# Patient Record
Sex: MALE | Race: BLACK OR AFRICAN AMERICAN | HISPANIC OR LATINO
[De-identification: names, ages, dates, MRNs, and addresses within clinical notes are randomized per-mention and may not be internally consistent; named-entity substitution may affect disease eponyms.]

---

## 2024-02-21 ENCOUNTER — NON-APPOINTMENT (OUTPATIENT)
Age: 24
End: 2024-02-21

## 2024-02-21 ENCOUNTER — APPOINTMENT (OUTPATIENT)
Dept: ORTHOPEDIC SURGERY | Facility: CLINIC | Age: 24
End: 2024-02-21
Payer: MEDICAID

## 2024-02-21 VITALS
HEART RATE: 56 BPM | SYSTOLIC BLOOD PRESSURE: 144 MMHG | BODY MASS INDEX: 36.57 KG/M2 | DIASTOLIC BLOOD PRESSURE: 75 MMHG | HEIGHT: 72 IN | WEIGHT: 270 LBS

## 2024-02-21 DIAGNOSIS — M79.644 PAIN IN RIGHT FINGER(S): ICD-10-CM

## 2024-02-21 PROBLEM — Z00.00 ENCOUNTER FOR PREVENTIVE HEALTH EXAMINATION: Status: ACTIVE | Noted: 2024-02-21

## 2024-02-21 PROCEDURE — 73140 X-RAY EXAM OF FINGER(S): CPT | Mod: RT

## 2024-02-21 PROCEDURE — 99203 OFFICE O/P NEW LOW 30 MIN: CPT | Mod: 25

## 2024-02-21 NOTE — END OF VISIT
[FreeTextEntry3] : This note was written by Gino Foster on 02/21/2024 acting solely as a scribe for Dr. Alex Bonner.   All medical record entries made by the Scribe were at my, Dr. Alex Bonner, direction and personally dictated by me on 02/21/2024. I have personally reviewed the chart and agree that the record accurately reflects my personal performance of the history, physical exam, assessment and plan.

## 2024-02-21 NOTE — DISCUSSION/SUMMARY
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   I had a discussion with the patient regarding today's visit, the prognosis of this diagnosis, and treatment recommendations and options.  At this time, I discussed treatment options of observation or surgical management. He told me that he would like to proceed with surgical management, I told him that I would not be able to operate on him until April. He told me that he would like to wait until then. I recommended an MRI to evaluate right little finger.  I would like to better evaluate the integrity of the volar plate to see if this is repairable or if he needs some type of reconstructive procedure.  He will follow up after his MRI to review the results and discuss treatment recommendations.   The patient has agreed to the above plan of management and has expressed full understanding. All questions were fully answered to the patient's satisfaction.   My cumulative time spent on this visit included: Preparation for the visit, review of the medical records, review of pertinent diagnostic studies, examination and counseling of the patient on the above diagnosis, treatment plan and prognosis, orders of diagnostic tests, medication and/or appropriate procedures and documentation in the medical records of today's visit.

## 2024-02-21 NOTE — HISTORY OF PRESENT ILLNESS
[Right] : right hand dominant [FreeTextEntry1] :  He comes in today for evaluation of right little finger injury 7-8 months ago while playing basketball. He states that it was dislocated at that time and was pushed back into place. He went to [facility], had x-rays done and was splinted. He has numbness and tingling, along with locking and extension.

## 2024-02-21 NOTE — PHYSICAL EXAM
[de-identified] : - Constitutional: This is a male in no obvious distress.  - Psych: Patient is alert and oriented to person, place and time.  Patient has a normal mood and affect. - Cardiovascular: Normal pulses throughout the upper extremities.  No significant varicosities are noted in the upper extremities. - Neuro: Strength and sensation are intact throughout the upper extremities.  Patient has normal coordination. - Respiratory:  Patient exhibits no evidence of shortness of breath or difficulty breathing. - Skin: No rashes, lesions, or other abnormalities are noted in the upper extremities. ---  Examination of his right little finger demonstrate hyperextension at the PIP joint with compensatory flexion at the DIP joint consistent with a swan-neck deformity.  There is gross instability of the PIP joint volar plate.  There is clicking with flexion which appears to be secondary to clicking of the lateral bands with flexion.  There is no evidence of trigger finger.  He is neurovascularly intact distally. [de-identified] : AP, lateral, and oblique radiographs of the right little finger demonstrate hyperextension at the PIP joint.

## 2024-02-28 ENCOUNTER — APPOINTMENT (OUTPATIENT)
Dept: MRI IMAGING | Facility: CLINIC | Age: 24
End: 2024-02-28
Payer: MEDICAID

## 2024-02-28 ENCOUNTER — OUTPATIENT (OUTPATIENT)
Dept: OUTPATIENT SERVICES | Facility: HOSPITAL | Age: 24
LOS: 1 days | End: 2024-02-28
Payer: MEDICAID

## 2024-02-28 DIAGNOSIS — M20.031 SWAN-NECK DEFORMITY OF RIGHT FINGER(S): ICD-10-CM

## 2024-02-28 PROCEDURE — 73218 MRI UPPER EXTREMITY W/O DYE: CPT

## 2024-02-28 PROCEDURE — 73218 MRI UPPER EXTREMITY W/O DYE: CPT | Mod: 26,RT

## 2024-03-06 ENCOUNTER — NON-APPOINTMENT (OUTPATIENT)
Age: 24
End: 2024-03-06

## 2024-03-06 PROBLEM — M20.031 SWAN-NECK DEFORMITY OF FINGER OF RIGHT HAND: Status: ACTIVE | Noted: 2024-02-21

## 2024-03-12 ENCOUNTER — APPOINTMENT (OUTPATIENT)
Dept: ORTHOPEDIC SURGERY | Facility: CLINIC | Age: 24
End: 2024-03-12
Payer: MEDICAID

## 2024-03-12 DIAGNOSIS — M20.031 SWAN-NECK DEFORMITY OF RIGHT FINGER(S): ICD-10-CM

## 2024-03-12 PROCEDURE — 99214 OFFICE O/P EST MOD 30 MIN: CPT

## 2024-03-12 NOTE — DISCUSSION/SUMMARY
[FreeTextEntry1] : I reviewed the MRI results with him.  I had a discussion regarding today's visit, the diagnosis and treatment recommendations and options.  We also discussed changes since the last visit.  At this time, I recommended surgery.  I recommended repair, possible reconstruction of his PIP joint volar plate.  I did tell him that if the volar plate is not amenable to repair/reconstruction, then he may require an FDS PIP joint tenodesis. At this time, he is in transition from his old insurance company and is unsure about whether he will be covered by insurance for the surgery and subsequent office visits and therapy. He will reach out once he resolves his issue with his insurance to discuss his options.  -  The nature and purposes of the operation/procedure was discussed in detail.  I discussed the surgical procedure in detail, as well as expected postoperative recovery and outcome. -  Possible risks, benefits, and complications (from known and unknown causes) of the procedure were discussed in detail.  -  Possible non-operative alternatives to the proposed treatment were discussed in detail.  -  He was told that possible risks/complications include, but are not limited to:  Infection, digital nerve or vessel injury, stiffness, painful scar, poor outcome, need for additional surgical procedures, and other unforeseen complications.  He understands that it is common to have a PIP joint flexion contracture after this procedure.  He understands that this may be permanent. -  In addition, the possibility of an "unsuccessful outcome," despite "successful surgery," was discussed with the patient.  Specifically, I discussed the chance of stretching out of the volar plate repair/reconstruction and possible recurrent symptoms. -  The patient fully understands these risks and wishes to proceed.  -  I had a lengthy discussion with the patient regarding today's visit, the diagnosis, and my surgical treatment recommendations.  The patient has agreed to the above plan of management and has expressed full understanding.  All questions were fully answered to the patient's satisfaction.  My cumulative time spent on today's visit was greater than 30 minutes and included: Preparation for the visit, review of the medical records, review of pertinent diagnostic studies, examination and counseling of the patient on the above diagnosis, treatment plan and prognosis, orders of diagnostic tests, medications and/or appropriate procedures and documentation in the medical records of today's visit.

## 2024-03-12 NOTE — HISTORY OF PRESENT ILLNESS
[FreeTextEntry1] : Follow-up regarding right little finger volar plate injury with instability of the volar plate, a swan-neck deformity and clicking of the lateral bands with flexion of the digit, status post an injury approximately 8 months ago.  See note from when he was seen in the office 20 days ago.  I recommended an MRI.  He comes in to review the results discuss treatment recommendations.

## 2024-03-12 NOTE — PHYSICAL EXAM
[de-identified] : - Constitutional: This is a male in no obvious distress.  - Psych: Patient is alert and oriented to person, place and time.  Patient has a normal mood and affect. - Cardiovascular: Normal pulses throughout the upper extremities.  No significant varicosities are noted in the upper extremities. - Neuro: Strength and sensation are intact throughout the upper extremities.  Patient has normal coordination. - Respiratory:  Patient exhibits no evidence of shortness of breath or difficulty breathing. - Skin: No rashes, lesions, or other abnormalities are noted in the upper extremities. ---  Examination of his right little finger demonstrate hyperextension at the PIP joint with compensatory flexion at the DIP joint consistent with a swan-neck deformity.  There is gross instability of the PIP joint volar plate.  There is clicking with flexion which appears to be secondary to clicking of the lateral bands with flexion.  There is no evidence of trigger finger.  He is neurovascularly intact distally. [de-identified] : An MRI of his right hand dated 2/28/2024 demonstrated: 1.  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 2.  Osseous remodeling and productive change about the radial margin of the little finger proximal phalanx head, likely posttraumatic in etiology.  AP, lateral, and oblique radiographs of the right little finger dated 2/21/2024 demonstrated hyperextension at the PIP joint.

## 2024-03-12 NOTE — END OF VISIT
[FreeTextEntry3] : This note was written by Houston Freedman on 3/12/24 acting solely as a scribe for Dr. Alex Bonner.   All medical record entries made by the Scribe were at my, Dr. Alex Bonner, direction and personally dictated by me on 3/12/24. I have personally reviewed the chart and agree that the record accurately reflects my personal performance of the history, physical exam, assessment and plan.

## 2024-03-12 NOTE — ADDENDUM
[FreeTextEntry1] : I, Houston Freedman, acted solely as a scribe for Dr. Bonner on this date on 3/12/24.